# Patient Record
Sex: FEMALE | Race: WHITE
[De-identification: names, ages, dates, MRNs, and addresses within clinical notes are randomized per-mention and may not be internally consistent; named-entity substitution may affect disease eponyms.]

---

## 2018-01-26 ENCOUNTER — HOSPITAL ENCOUNTER (OUTPATIENT)
Dept: HOSPITAL 92 - SCSMAMMO | Age: 38
Discharge: HOME | End: 2018-01-26
Attending: FAMILY MEDICINE
Payer: COMMERCIAL

## 2018-01-26 DIAGNOSIS — Z12.31: Primary | ICD-10-CM

## 2018-01-26 PROCEDURE — 77067 SCR MAMMO BI INCL CAD: CPT

## 2018-01-26 NOTE — MMO
BILATERAL SCREENING MAMMOGRAM: 

 

Date:  01/26/18 

 

HISTORY:  

Screening.

 

COMPARISON:  

None. 

 

TECHNIQUE:  

Bilateral screening CC and MLO mammograms. 

 

This patient's mammogram was interpreted with the assistance of computer-aided detection.  

 

FINDINGS:

There are benign calcifications in right and left breast. No suspicious mass, microcalcifications, or
 architectural distortion. 

 

IMPRESSION: 

 

BIRADS 2:  Benign Finding(s)

 

Continued annual mammographic screening is recommended. 

 

POS: [facility]